# Patient Record
Sex: FEMALE | Race: WHITE | NOT HISPANIC OR LATINO | Employment: UNEMPLOYED | URBAN - NONMETROPOLITAN AREA
[De-identification: names, ages, dates, MRNs, and addresses within clinical notes are randomized per-mention and may not be internally consistent; named-entity substitution may affect disease eponyms.]

---

## 2020-10-29 ENCOUNTER — HOSPITAL ENCOUNTER (OUTPATIENT)
Dept: GENERAL RADIOLOGY | Facility: HOSPITAL | Age: 30
Discharge: HOME | End: 2020-10-29
Admitting: NURSE PRACTITIONER

## 2020-10-29 PROCEDURE — 73610 X-RAY EXAM OF ANKLE: CPT

## 2025-06-27 ENCOUNTER — TELEPHONE (OUTPATIENT)
Dept: HEMATOLOGY | Age: 35
End: 2025-06-27

## 2025-06-27 NOTE — TELEPHONE ENCOUNTER
I REACHED OUT TO MRS. SHORT REGARDING NARCISO VELEZ, BEING ABLE TO PUT HER ON SCHEDULE FOR TODAY , PT STATED SHE COULDN'T MAKE IT TODAY, BUT TOOK ANOTHER APPT OFFERED TO HER ON THE JULY 18TH. PT SAID SHE WILL BE HERE.

## 2025-07-16 ENCOUNTER — TELEPHONE (OUTPATIENT)
Dept: HEMATOLOGY | Age: 35
End: 2025-07-16

## 2025-07-16 NOTE — TELEPHONE ENCOUNTER
CALLED BOTH PHONE NUMBERS ON FILE FOR MRS. SHORT, I HAD TO LVM'S ON BOTH. LET HER KNOW THAT WE ARE NEEDING TO GO OVER DETAILS WITH HER ABOUT NEEDING TO KEEP HER ON SAME DAY BUT SEEING A DIFF. PROVIDER AND DIFF. TIME, BUT WE NEED TO CONFIRM WITH HER TO KEEP HER ON SCHEDULE. ASKED HER TO CALL US BACK.

## 2025-07-17 ENCOUNTER — TELEPHONE (OUTPATIENT)
Dept: HEMATOLOGY | Age: 35
End: 2025-07-17

## 2025-07-17 DIAGNOSIS — D50.8 OTHER IRON DEFICIENCY ANEMIA: Primary | ICD-10-CM

## 2025-07-17 RX ORDER — FERROUS FUMARATE 324(106)MG
TABLET ORAL
COMMUNITY

## 2025-07-17 RX ORDER — CYCLOBENZAPRINE HCL 10 MG
10 TABLET ORAL 3 TIMES DAILY PRN
COMMUNITY

## 2025-07-17 RX ORDER — BUSPIRONE HYDROCHLORIDE 5 MG/1
5 TABLET ORAL 3 TIMES DAILY
COMMUNITY

## 2025-07-17 RX ORDER — NITROFURANTOIN MACROCRYSTALS 100 MG/1
100 CAPSULE ORAL 4 TIMES DAILY
COMMUNITY

## 2025-07-17 RX ORDER — FLUCONAZOLE 150 MG/1
150 TABLET ORAL ONCE
COMMUNITY

## 2025-07-17 RX ORDER — TRIAMCINOLONE ACETONIDE 1 MG/G
OINTMENT TOPICAL 2 TIMES DAILY
COMMUNITY

## 2025-07-17 RX ORDER — FEXOFENADINE HCL 180 MG/1
180 TABLET ORAL DAILY
COMMUNITY

## 2025-07-17 RX ORDER — ALBUTEROL SULFATE 90 UG/1
2 INHALANT RESPIRATORY (INHALATION) EVERY 6 HOURS PRN
COMMUNITY

## 2025-07-17 RX ORDER — SUMATRIPTAN SUCCINATE 100 MG/1
100 TABLET ORAL
COMMUNITY

## 2025-07-17 RX ORDER — FLUTICASONE PROPIONATE 50 MCG
1 SPRAY, SUSPENSION (ML) NASAL DAILY PRN
COMMUNITY

## 2025-07-17 RX ORDER — CETIRIZINE HYDROCHLORIDE 10 MG/1
10 TABLET ORAL DAILY
COMMUNITY

## 2025-07-17 RX ORDER — OMEPRAZOLE 40 MG/1
40 CAPSULE, DELAYED RELEASE ORAL DAILY
COMMUNITY

## 2025-07-17 RX ORDER — QUETIAPINE FUMARATE 25 MG/1
25 TABLET, FILM COATED ORAL 2 TIMES DAILY
COMMUNITY

## 2025-07-17 NOTE — TELEPHONE ENCOUNTER
Spoke with Felice at [facility] medical referring office. I explained that we have tried multiple times to get a hold of Marlene regarding apt. She does not return calls.

## 2025-07-17 NOTE — TELEPHONE ENCOUNTER
CALLED MRS. SHORT NUMBERS ON FILE, HAD TO [facility] AGAIN. LET HER KNOW WE ARE TRYING TO REACH OUT TO HER SO WE CAN CONFIRM FOR TOMORROW, SO SHE CAN BE KEPT ON SCHEDULE. ASKED HER TO CALL BACK.

## 2025-08-20 ENCOUNTER — TELEPHONE (OUTPATIENT)
Dept: HEMATOLOGY | Age: 35
End: 2025-08-20

## 2025-09-02 ENCOUNTER — TELEPHONE (OUTPATIENT)
Dept: HEMATOLOGY | Age: 35
End: 2025-09-02

## 2025-09-03 DIAGNOSIS — D64.9 ANEMIA, UNSPECIFIED TYPE: Primary | ICD-10-CM

## 2025-09-03 DIAGNOSIS — D50.9 IRON DEFICIENCY ANEMIA, UNSPECIFIED IRON DEFICIENCY ANEMIA TYPE: Primary | ICD-10-CM

## 2025-09-04 ENCOUNTER — HOSPITAL ENCOUNTER (OUTPATIENT)
Dept: INFUSION THERAPY | Age: 35
Discharge: HOME | End: 2025-09-04
Payer: MEDICARE

## 2025-09-04 ENCOUNTER — OFFICE VISIT (OUTPATIENT)
Dept: HEMATOLOGY | Age: 35
End: 2025-09-04
Payer: MEDICARE

## 2025-09-04 VITALS
SYSTOLIC BLOOD PRESSURE: 120 MMHG | WEIGHT: 152.2 LBS | DIASTOLIC BLOOD PRESSURE: 80 MMHG | HEIGHT: 61 IN | BODY MASS INDEX: 28.74 KG/M2 | TEMPERATURE: 98.2 F | HEART RATE: 82 BPM | OXYGEN SATURATION: 100 %

## 2025-09-04 DIAGNOSIS — Z98.84 HISTORY OF GASTRIC BYPASS: ICD-10-CM

## 2025-09-04 DIAGNOSIS — D50.8 OTHER IRON DEFICIENCY ANEMIA: Primary | ICD-10-CM

## 2025-09-04 DIAGNOSIS — Z78.9 NEED FOR FOLLOW-UP BY SOCIAL WORKER: ICD-10-CM

## 2025-09-04 DIAGNOSIS — D50.9 IRON DEFICIENCY ANEMIA, UNSPECIFIED IRON DEFICIENCY ANEMIA TYPE: ICD-10-CM

## 2025-09-04 DIAGNOSIS — D64.9 ANEMIA, UNSPECIFIED TYPE: ICD-10-CM

## 2025-09-04 DIAGNOSIS — K90.89 OTHER SPECIFIED INTESTINAL MALABSORPTION: ICD-10-CM

## 2025-09-04 LAB
BASOPHILS # BLD: 0.02 K/UL (ref 0–0.2)
BASOPHILS NFR BLD: 0.4 % (ref 0–1)
EOSINOPHIL # BLD: 0.19 K/UL (ref 0–0.6)
EOSINOPHIL NFR BLD: 4.1 % (ref 0–5)
ERYTHROCYTE [DISTWIDTH] IN BLOOD BY AUTOMATED COUNT: 17.1 % (ref 11.5–14.5)
FERRITIN SERPL-MCNC: 9.1 NG/ML (ref 13–150)
HCT VFR BLD AUTO: 30.8 % (ref 37–47)
HGB BLD-MCNC: 8.7 G/DL (ref 12–16)
IRON SATN MFR SERPL: 5 % (ref 15–50)
IRON SERPL-MCNC: 19 UG/DL (ref 37–145)
LYMPHOCYTES # BLD: 1.28 K/UL (ref 1.1–4.5)
LYMPHOCYTES NFR BLD: 27.5 % (ref 20–40)
MCH RBC QN AUTO: 19.3 PG (ref 27–31)
MCHC RBC AUTO-ENTMCNC: 28.2 G/DL (ref 33–37)
MCV RBC AUTO: 68.4 FL (ref 81–99)
MONOCYTES # BLD: 0.3 K/UL (ref 0–0.9)
MONOCYTES NFR BLD: 6.4 % (ref 1–10)
NEUTROPHILS # BLD: 2.86 K/UL (ref 1.5–7.5)
NEUTS SEG NFR BLD: 61.4 % (ref 50–65)
PLATELET # BLD AUTO: 397 K/UL (ref 130–400)
PMV BLD AUTO: 8.7 FL (ref 9.4–12.3)
RBC # BLD AUTO: 4.5 M/UL (ref 4.2–5.4)
TIBC SERPL-MCNC: 411 UG/DL (ref 250–400)
WBC # BLD AUTO: 4.66 K/UL (ref 4.8–10.8)

## 2025-09-04 PROCEDURE — 83550 IRON BINDING TEST: CPT

## 2025-09-04 PROCEDURE — 82728 ASSAY OF FERRITIN: CPT

## 2025-09-04 PROCEDURE — 36415 COLL VENOUS BLD VENIPUNCTURE: CPT

## 2025-09-04 PROCEDURE — 99203 OFFICE O/P NEW LOW 30 MIN: CPT | Performed by: PHYSICIAN ASSISTANT

## 2025-09-04 PROCEDURE — 83540 ASSAY OF IRON: CPT

## 2025-09-04 PROCEDURE — 85025 COMPLETE CBC W/AUTO DIFF WBC: CPT

## 2025-09-04 ASSESSMENT — ENCOUNTER SYMPTOMS
SHORTNESS OF BREATH: 0
VOICE CHANGE: 0
PHOTOPHOBIA: 0
DIARRHEA: 0
EYE ITCHING: 0
ABDOMINAL DISTENTION: 0
NAUSEA: 0
EYE DISCHARGE: 0
TROUBLE SWALLOWING: 0
BACK PAIN: 0
WHEEZING: 0
VOMITING: 0
COUGH: 0
CONSTIPATION: 0
ABDOMINAL PAIN: 0
COLOR CHANGE: 0
BLOOD IN STOOL: 0
SORE THROAT: 0

## 2025-09-05 PROBLEM — E61.1 IRON DEFICIENCY: Status: ACTIVE | Noted: 2025-09-05

## 2025-09-05 PROBLEM — K90.9 MALABSORPTION SYNDROME: Status: ACTIVE | Noted: 2025-09-05
